# Patient Record
Sex: MALE | Race: WHITE | NOT HISPANIC OR LATINO | Employment: FULL TIME | URBAN - METROPOLITAN AREA
[De-identification: names, ages, dates, MRNs, and addresses within clinical notes are randomized per-mention and may not be internally consistent; named-entity substitution may affect disease eponyms.]

---

## 2023-10-26 ENCOUNTER — TELEPHONE (OUTPATIENT)
Dept: OTOLARYNGOLOGY | Facility: CLINIC | Age: 48
End: 2023-10-26
Payer: COMMERCIAL

## 2023-10-26 DIAGNOSIS — H81.09 MENIERE'S DISEASE, UNSPECIFIED LATERALITY: ICD-10-CM

## 2023-10-26 RX ORDER — TRIAMTERENE AND HYDROCHLOROTHIAZIDE 37.5; 25 MG/1; MG/1
1 CAPSULE ORAL EVERY MORNING
Qty: 90 CAPSULE | Refills: 0 | Status: SHIPPED | OUTPATIENT
Start: 2023-10-26 | End: 2024-02-02

## 2023-10-26 RX ORDER — TRIAMTERENE/HYDROCHLOROTHIAZID 37.5-25 MG
1 TABLET ORAL ONCE
Status: CANCELLED | OUTPATIENT
Start: 2023-10-26 | End: 2023-10-26

## 2023-10-26 NOTE — TELEPHONE ENCOUNTER
PT CALLED HAS APPT SCHED WITH DR ATKINS [place], HE NEEDS REFILL FOR TRIAMTERENE 90 DAYS WITH NO REFILLS. DX: CAM    [phone]    [facility]

## 2023-10-26 NOTE — TELEPHONE ENCOUNTER
PT CALLED HAS APPT SCHED WITH DR ATKINS [place], HE NEEDS REFILL FOR TRIAMTERENE 90 DAYS WITH NO REFILLS. DX: CAM    [phone]    [facility]

## 2024-01-10 ENCOUNTER — OFFICE VISIT (OUTPATIENT)
Dept: OTOLARYNGOLOGY | Facility: CLINIC | Age: 49
End: 2024-01-10
Payer: COMMERCIAL

## 2024-01-10 VITALS — BODY MASS INDEX: 26.18 KG/M2 | WEIGHT: 204 LBS | HEIGHT: 74 IN

## 2024-01-10 DIAGNOSIS — H90.3 ASYMMETRICAL SENSORINEURAL HEARING LOSS: ICD-10-CM

## 2024-01-10 DIAGNOSIS — H81.09 MENIERE'S DISEASE, UNSPECIFIED LATERALITY: Primary | ICD-10-CM

## 2024-01-10 PROCEDURE — 99213 OFFICE O/P EST LOW 20 MIN: CPT | Performed by: OTOLARYNGOLOGY

## 2024-01-10 PROCEDURE — 1036F TOBACCO NON-USER: CPT | Performed by: OTOLARYNGOLOGY

## 2024-01-10 RX ORDER — VALACYCLOVIR HYDROCHLORIDE 1 G/1
1000 TABLET, FILM COATED ORAL 2 TIMES DAILY
COMMUNITY

## 2024-01-10 NOTE — PROGRESS NOTES
"HPI  Isauro Sanchez is a 48 y.o. male history of Ménière's disease with positive ECOG and asymmetric right-sided low-frequency sensorineural hearing loss on prior audiogram.  Workup dates back to about 2018.  He had an MRI at that time which was negative for CP angle pathology.  He has generally been managed with triamterene hydrochlorothiazide and had been doing well without much in the way of vertigo but unfortunately recently he has had more episodes of baseline dizziness and some additional episodes of worsened vertigo.  He can often have nausea and vomiting.  He has not had loss of consciousness.  He continues to take the diuretic.  He is not having any fluctuation of his hearing or tinnitus but the dizziness is a primary issue      Past Medical History:   Diagnosis Date    Noninfective gastroenteritis and colitis, unspecified     Chronic colitis            Medications:     Current Outpatient Medications:     triamterene-hydrochlorothiazid (Dyazide) 37.5-25 mg capsule, Take 1 capsule by mouth once daily in the morning., Disp: 90 capsule, Rfl: 0    valACYclovir (Valtrex) 1 gram tablet, Take 1 tablet (1,000 mg) by mouth 2 times a day., Disp: , Rfl:      Allergies:  Allergies   Allergen Reactions    Penicillins Other        Physical Exam:  Last Recorded Vitals  Height 1.88 m (6' 2\"), weight 92.5 kg (204 lb).  General:     General appearance: Well-developed, well-nourished in no acute distress.       Voice:  normal       Head/face: Normal appearance; nontender to palpation     Facial nerve function: Normal and symmetric bilaterally.    Oral/oropharynx:     Oral vestibule: Normal labial and gingival mucosa     Tongue/floor of mouth: Normal without lesion     Oropharynx: Clear.  No lesions present of the hard/soft palate, posterior pharynx    Neck:     Neck: Normal appearance, trachea midline     Salivary glands: Normal to palpation bilaterally     Lymph nodes: No cervical lymphadenopathy to palpation     Thyroid: No " thyromegaly.  No palpable nodules     Range of motion: Normal    Neurological:     Cortical functions: Alert and oriented x3, appropriate affect       Larynx/hypopharynx:     Laryngeal findings: Mirror exam inadequate or limited secondary to enlarged base of tongue and/or excessive gagging    Ear:     Ear canal: Normal bilaterally     Tympanic membrane: Intact and mobile bilaterally     Pinna: Normal bilaterally     Hearing:  Gross hearing assessment normal by voice    Nose:     Visualized using: Anterior rhinoscopy     Nasopharynx: Inadequate mirror exam secondary to gag, anatomy.       Nasal dorsum: Nontraumatic midline appearance     Septum: Midline     Inferior turbinates: Normally sized     Mucosa: Bilateral, pink, normal appearing       Assessment/Plan   This is a challenging situation.  He probably has Ménière's disease but it is not clear that this is the cause for his present description of symptoms.  I recommended that we repeat an audiogram and see if there is been any change.  We may consider updating the MRI to see if there is been any change in this regard as well.  I have recommended a neurology opinion but if it appears that there is fluctuation of the hearing, he may be a candidate for endolymphatic sac decompression or intratympanic treatment and we may consider referral to my neurotology colleagues         Maurice Costello MD

## 2024-01-17 ENCOUNTER — CLINICAL SUPPORT (OUTPATIENT)
Dept: AUDIOLOGY | Facility: CLINIC | Age: 49
End: 2024-01-17
Payer: COMMERCIAL

## 2024-01-17 DIAGNOSIS — H90.41 SENSORINEURAL HEARING LOSS (SNHL) OF RIGHT EAR WITH UNRESTRICTED HEARING OF LEFT EAR: Primary | ICD-10-CM

## 2024-01-17 PROCEDURE — 92550 TYMPANOMETRY & REFLEX THRESH: CPT | Performed by: AUDIOLOGIST

## 2024-01-17 PROCEDURE — 92557 COMPREHENSIVE HEARING TEST: CPT | Performed by: AUDIOLOGIST

## 2024-01-17 NOTE — LETTER
AUDIOLOGY ADULT AUDIOMETRIC EVALUATION    Name:  Isauro Sanchez  :  1975  Age:  48 y.o.  Date of Evaluation:  2024    Reason for visit: Mr. Sanchez is seen in the clinic today at the request of Maurice Costello MD in otolaryngology for an audiologic evaluation.    HISTORY  Patient reports worsening dizziness with right sided hearing loss, aural pressure, and tinnitus. Patient denies otalgia and otorrhea. Case history was obtained from the patient.    Previously diagnosed with Ménière's disease with positive ECOG and asymmetric right-sided low-frequency sensorineural hearing loss on 2021 audiogram.    EVALUATION  See scanned audiogram: “Media” > “Audiology Report” > Document ID [id number].    RESULTS  Otoscopic Evaluation  Right Ear: clear ear canal with visualization of the tympanic membrane  Left Ear: clear ear canal with visualization of the tympanic membrane    Immittance Measures  Tympanometry:  Right Ear: Type A, normal tympanic membrane mobility with normal middle ear pressure  Left Ear: Type A, normal tympanic membrane mobility with normal middle ear pressure    Acoustic Reflexes:  Ipsilateral Right Ear: present and normal from 500 Hz to 4000 Hz  Ipsilateral Left Ear: present and normal from 500 Hz to 4000 Hz  Contralateral Right Ear: did not evaluate  Contralateral Left Ear: did not evaluate    Distortion Product Otoacoustic Emissions (DPOAEs)  Right Ear: present from 1500 Hz to 2000 Hz, absent at 1000 Hz and from 3000 Hz to 8000 Hz  Left Ear: present from 1000 Hz to 2000 Hz and from 4000 Hz to 5000 Hz, absent at 3000 Hz and from 6000 Hz to 8000 Hz    Audiometry  Test Technique and Reliability:   Standard audiometry via supra-aural headphones. Reliability is good.    Pure tone air and bone conduction audiometry:  Right Ear: essentially moderate sensorineural hearing loss   Left Ear: normal hearing sensitivity    Speech Audiometry:  Speech Reception Threshold (SRT) Right Ear: 45 dB HL  with 25 dB HL of masking  Speech Reception Threshold (SRT) Left Ear: 10 dB HL   Word Recognition Score (WRS) Right Ear: Good, 88% at 85 dB HL with 55 dB HL of masking  Word Recognition Score (WRS) Left Ear: Excellent, 100% at 50 dB HL     IMPRESSIONS  Audiometric evaluation revealed normal hearing sensitivity in the left ear and an essentially moderate sensorineural hearing loss in the right ear. Results are essentially stable compared with 04/14/2021 evaluation. The presence of acoustic reflexes within normal intensity limits is consistent with normal middle ear and brainstem function, and suggests that auditory sensitivity is not significantly impaired. Present Distortion Product Otoacoustic Emissions (DPOAEs) suggest normal/near normal cochlear outer hair cell function and are consistent with no greater than a mild hearing loss at those frequencies. Absent DPOAEs are consistent with abnormal cochlear outer hair cell function and some degree of hearing loss at those frequencies.    RECOMMENDATIONS  - Follow up with otolaryngology as planned.  - Audiologic evaluation in conjunction with otologic care, if an acute change is noted, and/or annually.  - Consider hearing aid for the right ear pending medical clearance.  - Follow-up with medical care team as planned.    PATIENT EDUCATION  Discussed results, impressions and recommendations with the patient. Questions were addressed and the patient was encouraged to contact our office should concerns arise.    Time for this encounter: 0907-6165    Agus Begum, CCC-A  Licensed Audiologist

## 2024-01-17 NOTE — PROGRESS NOTES
AUDIOLOGY ADULT AUDIOMETRIC EVALUATION    Name:  Isauro Sanchez  :  1975  Age:  48 y.o.  Date of Evaluation:  2024    Reason for visit: Mr. Sanchez is seen in the clinic today at the request of Maurice Costello MD in otolaryngology for an audiologic evaluation.    HISTORY  Patient reports worsening dizziness with right sided hearing loss, aural pressure, and tinnitus. Patient denies otalgia and otorrhea. Case history was obtained from the patient.    Previously diagnosed with Ménière's disease with positive ECOG and asymmetric right-sided low-frequency sensorineural hearing loss on 2021 audiogram.    EVALUATION  See scanned audiogram: “Media” > “Audiology Report” > Document ID [id number].    RESULTS  Otoscopic Evaluation  Right Ear: clear ear canal with visualization of the tympanic membrane  Left Ear: clear ear canal with visualization of the tympanic membrane    Immittance Measures  Tympanometry:  Right Ear: Type A, normal tympanic membrane mobility with normal middle ear pressure  Left Ear: Type A, normal tympanic membrane mobility with normal middle ear pressure    Acoustic Reflexes:  Ipsilateral Right Ear: present and normal from 500 Hz to 4000 Hz  Ipsilateral Left Ear: present and normal from 500 Hz to 4000 Hz  Contralateral Right Ear: did not evaluate  Contralateral Left Ear: did not evaluate    Distortion Product Otoacoustic Emissions (DPOAEs)  Right Ear: present from 1500 Hz to 2000 Hz, absent at 1000 Hz and from 3000 Hz to 8000 Hz  Left Ear: present from 1000 Hz to 2000 Hz and from 4000 Hz to 5000 Hz, absent at 3000 Hz and from 6000 Hz to 8000 Hz    Audiometry  Test Technique and Reliability:   Standard audiometry via supra-aural headphones. Reliability is good.    Pure tone air and bone conduction audiometry:  Right Ear: essentially moderate sensorineural hearing loss   Left Ear: normal hearing sensitivity    Speech Audiometry:  Speech Reception Threshold (SRT) Right Ear: 45 dB HL  with 25 dB HL of masking  Speech Reception Threshold (SRT) Left Ear: 10 dB HL   Word Recognition Score (WRS) Right Ear: Good, 88% at 85 dB HL with 55 dB HL of masking  Word Recognition Score (WRS) Left Ear: Excellent, 100% at 50 dB HL     IMPRESSIONS  Audiometric evaluation revealed normal hearing sensitivity in the left ear and an essentially moderate sensorineural hearing loss in the right ear. Results are essentially stable compared with 04/14/2021 evaluation. The presence of acoustic reflexes within normal intensity limits is consistent with normal middle ear and brainstem function, and suggests that auditory sensitivity is not significantly impaired. Present Distortion Product Otoacoustic Emissions (DPOAEs) suggest normal/near normal cochlear outer hair cell function and are consistent with no greater than a mild hearing loss at those frequencies. Absent DPOAEs are consistent with abnormal cochlear outer hair cell function and some degree of hearing loss at those frequencies.    RECOMMENDATIONS  - Follow up with otolaryngology as planned.  - Audiologic evaluation in conjunction with otologic care, if an acute change is noted, and/or annually.  - Consider hearing aid for the right ear pending medical clearance.  - Follow-up with medical care team as planned.    PATIENT EDUCATION  Discussed results, impressions and recommendations with the patient. Questions were addressed and the patient was encouraged to contact our office should concerns arise.    Time for this encounter: [id number]    Agus Begum, CCC-A  Licensed Audiologist

## 2025-02-25 ENCOUNTER — OFFICE VISIT (OUTPATIENT)
Dept: ORTHOPEDIC SURGERY | Facility: HOSPITAL | Age: 50
End: 2025-02-25
Payer: COMMERCIAL

## 2025-02-25 ENCOUNTER — HOSPITAL ENCOUNTER (OUTPATIENT)
Dept: RADIOLOGY | Facility: HOSPITAL | Age: 50
Discharge: HOME | End: 2025-02-25
Payer: COMMERCIAL

## 2025-02-25 DIAGNOSIS — M25.569 KNEE PAIN, UNSPECIFIED CHRONICITY, UNSPECIFIED LATERALITY: ICD-10-CM

## 2025-02-25 DIAGNOSIS — M70.41 PREPATELLAR BURSITIS OF RIGHT KNEE: Primary | ICD-10-CM

## 2025-02-25 PROCEDURE — 99204 OFFICE O/P NEW MOD 45 MIN: CPT | Performed by: STUDENT IN AN ORGANIZED HEALTH CARE EDUCATION/TRAINING PROGRAM

## 2025-02-25 PROCEDURE — 73564 X-RAY EXAM KNEE 4 OR MORE: CPT | Mod: RT

## 2025-02-25 PROCEDURE — 2500000004 HC RX 250 GENERAL PHARMACY W/ HCPCS (ALT 636 FOR OP/ED): Performed by: STUDENT IN AN ORGANIZED HEALTH CARE EDUCATION/TRAINING PROGRAM

## 2025-02-25 PROCEDURE — 99214 OFFICE O/P EST MOD 30 MIN: CPT | Mod: 25 | Performed by: STUDENT IN AN ORGANIZED HEALTH CARE EDUCATION/TRAINING PROGRAM

## 2025-02-25 PROCEDURE — 73564 X-RAY EXAM KNEE 4 OR MORE: CPT | Mod: RIGHT SIDE | Performed by: RADIOLOGY

## 2025-02-25 PROCEDURE — 20610 DRAIN/INJ JOINT/BURSA W/O US: CPT | Mod: RT | Performed by: STUDENT IN AN ORGANIZED HEALTH CARE EDUCATION/TRAINING PROGRAM

## 2025-02-25 RX ORDER — LIDOCAINE HYDROCHLORIDE 10 MG/ML
4 INJECTION, SOLUTION INFILTRATION; PERINEURAL
Status: COMPLETED | OUTPATIENT
Start: 2025-02-25 | End: 2025-02-25

## 2025-02-25 RX ORDER — TRIAMCINOLONE ACETONIDE 40 MG/ML
40 INJECTION, SUSPENSION INTRA-ARTICULAR; INTRAMUSCULAR
Status: COMPLETED | OUTPATIENT
Start: 2025-02-25 | End: 2025-02-25

## 2025-02-25 RX ADMIN — TRIAMCINOLONE ACETONIDE 40 MG: 400 INJECTION, SUSPENSION INTRA-ARTICULAR; INTRAMUSCULAR at 16:32

## 2025-02-25 RX ADMIN — LIDOCAINE HYDROCHLORIDE 4 ML: 10 INJECTION, SOLUTION INFILTRATION; PERINEURAL at 16:32

## 2025-02-25 NOTE — PROGRESS NOTES
Isauro Sanchez  is a 49 y.o. year-old  male. he  is a new patient to our office and presents with a chief complaint of Right  knee pain and swelling.  He noticed this about a week and a half ago while he was down in [place] cleaning up the property when he was kneeling he thinks he may be twisted while he is kneeling on the knee and then had instant pain.  In the ensuing days he noticed some swelling in front of the knee.  At 1 point it was quite large and then he did start some icing and anti-inflammatories and is since come down but has persisted.  It still painful makes the knee feel stiff.      Past Medical, Family, and Social History reviewed   Review of Systems  A complete review of systems was conducted, pertinent only to the HPI noted above.  Constitutional: None  Eyes: No additions to above history  Ears, Nose, Throat: No additions to above history  Cardiovascular: No additions to above history  Respiratory: No additions to above history  GI: No additions to above history  : No additions to above history  Skin/Neuro: No additions to above history  Endocrine/Heme/Lymph: No additions to above history  Immunologic: No additions to above history  Psychiatric: No additions to above history  Musculoskeletal: see above    GEN: Alert and Oriented x 3  Constitutional: Well appearing , in no apparent distress.  Skin: No rashes, erythema, or induration around knee    MUSCULOSKELETAL EXAM:     Right KNEE:  ROM: 0/0/130  Effusion: negative  Alignment: [neutral]  Positive prepatellar fluid collection no evidence of infection    Gait: [normal]  Sensation intact bilaterally sural/saphenous/sp/dp/tibial nerve bilaterally  Motor 5/5 knee flexion/extension/foot DF/PF/EHL/FHL bilaterally  Palpable/symmetric DP and PT pulse bilaterally      PATELLAR/EXTENSOR MECHANISM:   KNEE:  Patellar Crepitus: n  Patellar Compression Pain:n  Patellar Apprehension: [no]  Extensor Mechanism: [intact]  Straight Leg Raise:  good      LIGAMENTS:  ACL: Lachmans: [negative]  PCL: [stable]  Valgus at 0 degrees: [stable]  Valgus at 30 degrees: [stable]  Varus at 0 degrees: [stable]  Varus at 30 degrees: [stable]    MENISCUS EXAM:  Joint Line Tenderness:medial joint line tenderness  McMurrays: [negative]  Pain with Deep Flexion: [No]    Xrays independently viewed and interpreted: No significant degenerative changes, soft tissue density anterior to patellar tendon    L Inj/Asp: R patellar bursa on 2/25/2025 4:32 PM  Indications: pain  Details: 21 G needle, superior approach  Medications: 40 mg triamcinolone acetonide 40 mg/mL; 4 mL lidocaine 10 mg/mL (1 %)  Aspirate: 3 mL bloody  Outcome: tolerated well, no immediate complications  Procedure, treatment alternatives, risks and benefits explained, specific risks discussed. Consent was given by the patient. Immediately prior to procedure a time out was called to verify the correct patient, procedure, equipment, support staff and site/side marked as required. Patient was prepped and draped in the usual sterile fashion.       I reviewed with Isauro diagnosis of prepatellar bursitis.  I did aspirate today and injected corticosteroid.  Reviewed that it was mostly bloody drainage consistent with a hemorrhagic bursitis likely from trauma with kneeling.  I have recommended strict compression sleeve, icing 3 times a day try to limit activities is much as possible.  He will observe for signs of infection and call me immediately.  Discussed the possible risk of recurrence.  All questions were answered he is agreement with this plan.